# Patient Record
(demographics unavailable — no encounter records)

---

## 2025-03-03 NOTE — PLAN
[FreeTextEntry1] : f/u in one year for annual pending labs Will also send in form from work that says she has had an annual physical  I have spent 20 minutes of time on the encounter on acute issues which excludes teaching and separately reported services of the preventative exam

## 2025-03-03 NOTE — PHYSICAL EXAM
[Normal Sclera/Conjunctiva] : normal sclera/conjunctiva [Normal Outer Ear/Nose] : the outer ears and nose were normal in appearance [No JVD] : no jugular venous distention [Normal] : normal rate, regular rhythm, normal S1 and S2 and no murmur heard [Coordination Grossly Intact] : coordination grossly intact [No Focal Deficits] : no focal deficits [Normal Affect] : the affect was normal [Normal Insight/Judgement] : insight and judgment were intact [de-identified] : Hyperpigmented macules along trunk and upper extremities

## 2025-03-03 NOTE — HEALTH RISK ASSESSMENT
[Good] : ~his/her~  mood as  good [2 - 4 times a month (2 pts)] : 2-4 times a month (2 points) [1 or 2 (0 pts)] : 1 or 2 (0 points) [Never (0 pts)] : Never (0 points) [Yes] : In the past 12 months have you used drugs other than those required for medical reasons? Yes [No falls in past year] : Patient reported no falls in the past year [Little interest or pleasure doing things] : 1) Little interest or pleasure doing things [Feeling down, depressed, or hopeless] : 2) Feeling down, depressed, or hopeless [0] : 2) Feeling down, depressed, or hopeless: Not at all (0) [PHQ-2 Negative - No further assessment needed] : PHQ-2 Negative - No further assessment needed [Patient reported PAP Smear was normal] : Patient reported PAP Smear was normal [HIV Test offered] : HIV Test offered [Hepatitis C test offered] : Hepatitis C test offered [With Significant Other] : lives with significant other [Employed] : employed [Student] : student [College] : College [Significant Other] : lives with significant other [Sexually Active] : sexually active [Feels Safe at Home] : Feels safe at home [Never] : Never [NO] : No [Time Spent: ___ Minutes] : I spent [unfilled] minutes performing a depression screening for this patient. [Audit-CScore] : 2 [de-identified] : Marijuana [de-identified] : not very active [de-identified] : regular diet  [[id number]] : 0 [Reports changes in hearing] : Reports no changes in hearing [Reports changes in vision] : Reports no changes in vision [Reports changes in dental health] : Reports no changes in dental health [PapSmearDate] : 01/2022 [FreeTextEntry2] :

## 2025-03-03 NOTE — HISTORY OF PRESENT ILLNESS
[Other: _____] : [unfilled] [FreeTextEntry1] : Complete Physical Exam [de-identified] : Has skin rash and has tried calamine, oatmeal baths, and Benadryl and Zyrtec without relief  Eucerin cream helps a little bit sometimes itchy sometimes not has been going for 2.5 months  Would like referral to dermatology  - Mammogram: Not due no family history - Colonoscopy: not due no family history  Is requesting STI testing -asymptomatic

## 2025-05-05 NOTE — ASSESSMENT
[FreeTextEntry1] : #Dermatitis #PIH #Xerosis chronic, stable -ddx of dermatitis includes eczema, resolving [place], contact dermatitis - not active on exam today -I counseled the patient on the chronic nature of the condition, natural course, and treatment options -reassurance provided regarding PIH, will slowly fade with time -sun protection daily with SPF 30 or above -RTC when rash flaring  RTC PRN

## 2025-05-05 NOTE — HISTORY OF PRESENT ILLNESS
[FreeTextEntry1] : dry patchy skin [de-identified] : 22 year old female patient presents today for "patchy skin" accompanied by her boyfriend  Location: arms, trunk, back, neck and upper legs Duration: 2 years on and off. flaring for the past few months Symptoms: dry patchy skin, sometimes itchy Treatment: Calamine lotion, was prescribed ketoconazole cream but did not start d/t concern for liver SE  thought it was ring worm when it started

## 2025-05-05 NOTE — PHYSICAL EXAM
[Alert] : alert [Oriented x 3] : ~L oriented x 3 [FreeTextEntry3] : Focused exam: -xerosis, few ill defined hypopigmented patches bl axillae, thighs

## 2025-06-09 NOTE — HISTORY OF PRESENT ILLNESS
[FreeTextEntry1] : rash [de-identified] : 22 year old female pt presents c/o rash. here with her boyfriend  # Rash Onset: 3 days  Location: hands, left forearm  Symptoms (pain, itch, bleeding, etc):  itch, "sting" sensation  Previous treatments and response: Benadryl, Allegra, Desitin, clotrimazole, no imp. noticed.   works as a

## 2025-06-09 NOTE — ASSESSMENT
[FreeTextEntry1] : #Favor eczema  possible component of irritant dermatitis new problem, uncertain prognosis -start clobetasol 0.05% cream bid to affected areas on the body. -consider patch testing with A&I -emollients -cotton gloves  RTC PRN